# Patient Record
Sex: MALE | Race: WHITE | HISPANIC OR LATINO | Employment: FULL TIME | URBAN - METROPOLITAN AREA
[De-identification: names, ages, dates, MRNs, and addresses within clinical notes are randomized per-mention and may not be internally consistent; named-entity substitution may affect disease eponyms.]

---

## 2021-10-26 ENCOUNTER — HOSPITAL ENCOUNTER (OUTPATIENT)
Dept: VASCULAR ULTRASOUND | Facility: HOSPITAL | Age: 56
Discharge: HOME | End: 2021-10-26
Payer: COMMERCIAL

## 2021-10-26 DIAGNOSIS — M79.2 NERVE PAIN: ICD-10-CM

## 2021-10-26 DIAGNOSIS — M79.662 BILATERAL CALF PAIN: ICD-10-CM

## 2021-10-26 DIAGNOSIS — M79.661 BILATERAL CALF PAIN: ICD-10-CM

## 2021-10-26 PROCEDURE — 93971 EXTREMITY STUDY: CPT

## 2021-10-27 PROCEDURE — 93971 EXTREMITY STUDY: CPT | Performed by: SURGERY

## 2022-11-28 ENCOUNTER — TELEPHONE (OUTPATIENT)
Dept: GASTROENTEROLOGY | Facility: CLINIC | Age: 57
End: 2022-11-28

## 2024-07-09 ENCOUNTER — OFFICE VISIT (OUTPATIENT)
Dept: GASTROENTEROLOGY | Facility: CLINIC | Age: 59
End: 2024-07-09
Payer: COMMERCIAL

## 2024-07-09 VITALS
DIASTOLIC BLOOD PRESSURE: 82 MMHG | RESPIRATION RATE: 18 BRPM | TEMPERATURE: 98.1 F | HEIGHT: 69 IN | BODY MASS INDEX: 28.41 KG/M2 | WEIGHT: 191.8 LBS | SYSTOLIC BLOOD PRESSURE: 122 MMHG | OXYGEN SATURATION: 98 % | HEART RATE: 69 BPM

## 2024-07-09 DIAGNOSIS — K21.9 GASTROESOPHAGEAL REFLUX DISEASE, UNSPECIFIED WHETHER ESOPHAGITIS PRESENT: ICD-10-CM

## 2024-07-09 DIAGNOSIS — I25.10 CORONARY ARTERY DISEASE INVOLVING NATIVE CORONARY ARTERY OF NATIVE HEART WITHOUT ANGINA PECTORIS: ICD-10-CM

## 2024-07-09 DIAGNOSIS — K27.9 PUD (PEPTIC ULCER DISEASE): Primary | ICD-10-CM

## 2024-07-09 DIAGNOSIS — Z12.11 SCREENING FOR COLON CANCER: ICD-10-CM

## 2024-07-09 PROCEDURE — 99204 OFFICE O/P NEW MOD 45 MIN: CPT | Performed by: PHYSICIAN ASSISTANT

## 2024-07-09 RX ORDER — CLOPIDOGREL BISULFATE 75 MG/1
75 TABLET ORAL DAILY
COMMUNITY
Start: 2024-04-19 | End: 2024-11-13

## 2024-07-09 RX ORDER — APIXABAN 5 MG/1
TABLET, FILM COATED ORAL
COMMUNITY
Start: 2024-06-03

## 2024-07-09 RX ORDER — FAMOTIDINE 20 MG/1
20 TABLET, FILM COATED ORAL 2 TIMES DAILY
COMMUNITY
Start: 2024-04-19

## 2024-07-09 RX ORDER — LISINOPRIL 2.5 MG/1
1 TABLET ORAL DAILY
COMMUNITY
Start: 2024-02-27

## 2024-07-09 RX ORDER — ASPIRIN 81 MG/1
81 TABLET, CHEWABLE ORAL DAILY
COMMUNITY

## 2024-07-09 RX ORDER — INSULIN LISPRO 100 [IU]/ML
INJECTION, SOLUTION INTRAVENOUS; SUBCUTANEOUS
COMMUNITY
Start: 2024-02-19

## 2024-07-09 RX ORDER — BACLOFEN 10 MG/1
10 TABLET ORAL 2 TIMES DAILY
COMMUNITY
Start: 2024-04-19

## 2024-07-09 RX ORDER — EZETIMIBE 10 MG/1
TABLET ORAL
COMMUNITY
Start: 2024-05-23

## 2024-07-09 RX ORDER — GABAPENTIN 100 MG/1
100 CAPSULE ORAL 3 TIMES DAILY
COMMUNITY
Start: 2024-04-19

## 2024-07-09 NOTE — PROGRESS NOTES
[facility]  Stanley العراقي 59 y.o. male MRN: [id number]       CC: EGD and colonoscopy consultation    HPI: Marquise is a pleasant 59-year-old male with history of atrial fibrillation status post watchman's procedure now off of Eliquis, CAD on Plavix, previous upper GI bleeding from gastric ulcer in 2022, and type 2 diabetes.  Patient presents the office today by referral of his PCP for EGD and colonoscopy consultation.  Patient reports that a couple of months ago, he was experiencing an increase in his acid reflux symptoms, and his PCP had placed him on famotidine which has been working well.  In 2022, the patient had history of upper GI bleed from a gastric ulcer.  Patient reports that he did not have a follow-up EGD, and I do not see this in the system at [facility].  Patient reports that it has been greater than 10 years since his last colonoscopy.  There is no family history of GI malignancy.  He reports that since his last GI bleed, he does watch the color of his stools.  Denies melena or rectal bleeding.  He has no other concerns from a GI standpoint at this time.      Review of Systems:    CONSTITUTIONAL: Denies any fever, chills, or rigors. Good appetite, and no recent weight loss.  HEENT: No earache or tinnitus. Denies hearing loss or visual disturbances.  CARDIOVASCULAR: No chest pain or palpitations.   RESPIRATORY: Denies any cough, hemoptysis, shortness of breath or dyspnea on exertion.  GASTROINTESTINAL: As noted in the History of Present Illness.   GENITOURINARY: No problems with urination. Denies any hematuria or dysuria.  NEUROLOGIC: No dizziness or vertigo, denies headaches.   MUSCULOSKELETAL: Denies any muscle or joint pain.   SKIN: Denies skin rashes or itching.   ENDOCRINE: Denies excessive thirst. Denies intolerance to heat or cold.  PSYCHOSOCIAL: Denies depression or anxiety. Denies any recent memory loss.       Current Outpatient Medications   Medication Sig Dispense Refill     aspirin 81 mg chewable tablet Chew 81 mg daily      baclofen 10 mg tablet Take 10 mg by mouth 2 (two) times a day      clopidogrel (PLAVIX) 75 mg tablet Take 75 mg by mouth daily      ezetimibe (ZETIA) 10 mg tablet       gabapentin (NEURONTIN) 100 mg capsule Take 100 mg by mouth Three times a day      insulin lispro (HumaLOG KwikPen) 100 units/mL injection pen INJECT 8 UNITS SUBCUTANEOUSLY IN THE MORNING , 8 UNITS AT NOON  AND 8 UNITS IN THE EVENING .  INJECT WITH MEALS      lisinopril (ZESTRIL) 2.5 mg tablet Take 1 tablet by mouth daily      Eliquis 5 MG TAKE 1 TABLET BY MOUTH IN THE  MORNING AND 1 TABLET BY MOUTH  BEFORE BEDTIME (Patient not taking: Reported on 7/9/2024)      famotidine (PEPCID) 20 mg tablet Take 20 mg by mouth 2 (two) times a day (Patient not taking: Reported on 7/9/2024)       No current facility-administered medications for this visit.     Past Medical History:   Diagnosis Date    Diabetes (HCC)     Diabetes mellitus (HCC)      History reviewed. No pertinent surgical history.  Social History     Socioeconomic History    Marital status: /Civil Union     Spouse name: None    Number of children: None    Years of education: None    Highest education level: None   Occupational History    None   Tobacco Use    Smoking status: Never    Smokeless tobacco: Never   Vaping Use    Vaping status: Never Used   Substance and Sexual Activity    Alcohol use: Not Currently    Drug use: Never    Sexual activity: None   Other Topics Concern    None   Social History Narrative    None     Social Determinants of Health     Financial Resource Strain: Not on file   Food Insecurity: Not on file   Transportation Needs: Not on file   Physical Activity: Not on file   Stress: Not on file   Social Connections: Unknown (6/18/2024)    Received from [employer]    Social mydeco     How often do you feel lonely or isolated from those around you? (Adult - for ages 18 years and over): Not on file   Intimate Partner  "Violence: Not on file   Housing Stability: Not on file     Family History   Problem Relation Age of Onset    No Known Problems Mother     No Known Problems Father     No Known Problems Sister     No Known Problems Brother     No Known Problems Maternal Grandmother     No Known Problems Maternal Grandfather     No Known Problems Paternal Grandmother     No Known Problems Paternal Grandfather     No Known Problems Maternal Aunt     No Known Problems Maternal Uncle     No Known Problems Paternal Aunt     No Known Problems Paternal Uncle     No Known Problems Cousin             PHYSICAL EXAM:    Vitals:    07/09/24 1605   BP: 122/82   BP Location: Left arm   Patient Position: Sitting   Cuff Size: Standard   Pulse: 69   Resp: 18   Temp: 98.1 °F (36.7 °C)   TempSrc: Tympanic   SpO2: 98%   Weight: 87 kg (191 lb 12.8 oz)   Height: 5' 9\" (1.753 m)     General Appearance:   Alert and oriented x 3. Cooperative, and in no respiratory distress   HEENT:   Normocephalic, atraumatic, anicteric.     Neck:  Supple, symmetrical, trachea midline   Lungs:   Clear to auscultation bilaterally    Heart::   Regular rate and rhythm   Abdomen:   Soft, non-tender, non-distended; normal bowel sounds; no masses, no organomegaly    Genitalia:   Deferred    Rectal:   Deferred    Extremities:  No cyanosis, clubbing or edema    Pulses:  2+ and symmetric all extremities    Skin:  Skin color, texture, turgor normal, no rashes or lesions    Lymph nodes:  No palpable cervical or supraclavicular lymphadenopathy        Lab Results:       Results from last 6 Months   Lab Units 04/05/24  0752   POTASSIUM mmol/L 5.3*   CHLORIDE mmol/L 100   CO2 mmol/L 26   BUN mg/dL 17   CREATININE mg/dL 1.3*   CALCIUM mg/dL 9.2               Imaging Studies:   No results found.    ASSESSMENT and PLAN:      1) Need for screening colonoscopy, history of CAD on Plavix - We went over risks and benefits of colonoscopy, patient would like to proceed with scheduling.  We will " "asked permission to hold Plavix for 5 days from his cardiologist at [facility].  We will go over MiraLAX prep instructions in the office.    2) Peptic ulcer disease, GERD - Patient was treated for a bleeding antral ulcer in 2022.  Does not appear that he had a follow-up EGD to confirm healing of the peptic ulcer.  Patient reports that he is doing well on Pepcid as prescribed by his PCP at [facility], Dr. Reyes.  He also encouraged the patient to follow-up regarding a follow-up EGD.  Patient reports that he does look at his stool to ensure that he is not having melena, which she did experience during his last GI bleed.  Risks and benefits reviewed the patient, he would like to proceed with scheduling.        Follow up as neede if other GI concerns arise, otherwise for future screening colonoscopy as recommended.d      Portions of the record may have been created with voice recognition software.  Occasional wrong word or \"sound a like\" substitutions may have occurred due to the inherent limitations of voice recognition software.  Read the chart carefully and recognize, using context, where substitutions have occurred.  "

## 2024-07-09 NOTE — H&P (VIEW-ONLY)
[facility]  Stanley العراقي 59 y.o. male MRN: [id number]       CC: EGD and colonoscopy consultation    HPI: Marquise is a pleasant 59-year-old male with history of atrial fibrillation status post watchman's procedure now off of Eliquis, CAD on Plavix, previous upper GI bleeding from gastric ulcer in 2022, and type 2 diabetes.  Patient presents the office today by referral of his PCP for EGD and colonoscopy consultation.  Patient reports that a couple of months ago, he was experiencing an increase in his acid reflux symptoms, and his PCP had placed him on famotidine which has been working well.  In 2022, the patient had history of upper GI bleed from a gastric ulcer.  Patient reports that he did not have a follow-up EGD, and I do not see this in the system at [facility].  Patient reports that it has been greater than 10 years since his last colonoscopy.  There is no family history of GI malignancy.  He reports that since his last GI bleed, he does watch the color of his stools.  Denies melena or rectal bleeding.  He has no other concerns from a GI standpoint at this time.      Review of Systems:    CONSTITUTIONAL: Denies any fever, chills, or rigors. Good appetite, and no recent weight loss.  HEENT: No earache or tinnitus. Denies hearing loss or visual disturbances.  CARDIOVASCULAR: No chest pain or palpitations.   RESPIRATORY: Denies any cough, hemoptysis, shortness of breath or dyspnea on exertion.  GASTROINTESTINAL: As noted in the History of Present Illness.   GENITOURINARY: No problems with urination. Denies any hematuria or dysuria.  NEUROLOGIC: No dizziness or vertigo, denies headaches.   MUSCULOSKELETAL: Denies any muscle or joint pain.   SKIN: Denies skin rashes or itching.   ENDOCRINE: Denies excessive thirst. Denies intolerance to heat or cold.  PSYCHOSOCIAL: Denies depression or anxiety. Denies any recent memory loss.       Current Outpatient Medications   Medication Sig Dispense Refill     aspirin 81 mg chewable tablet Chew 81 mg daily      baclofen 10 mg tablet Take 10 mg by mouth 2 (two) times a day      clopidogrel (PLAVIX) 75 mg tablet Take 75 mg by mouth daily      ezetimibe (ZETIA) 10 mg tablet       gabapentin (NEURONTIN) 100 mg capsule Take 100 mg by mouth Three times a day      insulin lispro (HumaLOG KwikPen) 100 units/mL injection pen INJECT 8 UNITS SUBCUTANEOUSLY IN THE MORNING , 8 UNITS AT NOON  AND 8 UNITS IN THE EVENING .  INJECT WITH MEALS      lisinopril (ZESTRIL) 2.5 mg tablet Take 1 tablet by mouth daily      Eliquis 5 MG TAKE 1 TABLET BY MOUTH IN THE  MORNING AND 1 TABLET BY MOUTH  BEFORE BEDTIME (Patient not taking: Reported on 7/9/2024)      famotidine (PEPCID) 20 mg tablet Take 20 mg by mouth 2 (two) times a day (Patient not taking: Reported on 7/9/2024)       No current facility-administered medications for this visit.     Past Medical History:   Diagnosis Date    Diabetes (HCC)     Diabetes mellitus (HCC)      History reviewed. No pertinent surgical history.  Social History     Socioeconomic History    Marital status: /Civil Union     Spouse name: None    Number of children: None    Years of education: None    Highest education level: None   Occupational History    None   Tobacco Use    Smoking status: Never    Smokeless tobacco: Never   Vaping Use    Vaping status: Never Used   Substance and Sexual Activity    Alcohol use: Not Currently    Drug use: Never    Sexual activity: None   Other Topics Concern    None   Social History Narrative    None     Social Determinants of Health     Financial Resource Strain: Not on file   Food Insecurity: Not on file   Transportation Needs: Not on file   Physical Activity: Not on file   Stress: Not on file   Social Connections: Unknown (6/18/2024)    Received from [employer]    Social [employer]     How often do you feel lonely or isolated from those around you? (Adult - for ages 18 years and over): Not on file   Intimate Partner  "Violence: Not on file   Housing Stability: Not on file     Family History   Problem Relation Age of Onset    No Known Problems Mother     No Known Problems Father     No Known Problems Sister     No Known Problems Brother     No Known Problems Maternal Grandmother     No Known Problems Maternal Grandfather     No Known Problems Paternal Grandmother     No Known Problems Paternal Grandfather     No Known Problems Maternal Aunt     No Known Problems Maternal Uncle     No Known Problems Paternal Aunt     No Known Problems Paternal Uncle     No Known Problems Cousin             PHYSICAL EXAM:    Vitals:    07/09/24 1605   BP: 122/82   BP Location: Left arm   Patient Position: Sitting   Cuff Size: Standard   Pulse: 69   Resp: 18   Temp: 98.1 °F (36.7 °C)   TempSrc: Tympanic   SpO2: 98%   Weight: 87 kg (191 lb 12.8 oz)   Height: 5' 9\" (1.753 m)     General Appearance:   Alert and oriented x 3. Cooperative, and in no respiratory distress   HEENT:   Normocephalic, atraumatic, anicteric.     Neck:  Supple, symmetrical, trachea midline   Lungs:   Clear to auscultation bilaterally    Heart::   Regular rate and rhythm   Abdomen:   Soft, non-tender, non-distended; normal bowel sounds; no masses, no organomegaly    Genitalia:   Deferred    Rectal:   Deferred    Extremities:  No cyanosis, clubbing or edema    Pulses:  2+ and symmetric all extremities    Skin:  Skin color, texture, turgor normal, no rashes or lesions    Lymph nodes:  No palpable cervical or supraclavicular lymphadenopathy        Lab Results:       Results from last 6 Months   Lab Units 04/05/24  0752   POTASSIUM mmol/L 5.3*   CHLORIDE mmol/L 100   CO2 mmol/L 26   BUN mg/dL 17   CREATININE mg/dL 1.3*   CALCIUM mg/dL 9.2               Imaging Studies:   No results found.    ASSESSMENT and PLAN:      1) Need for screening colonoscopy, history of CAD on Plavix - We went over risks and benefits of colonoscopy, patient would like to proceed with scheduling.  We will " "asked permission to hold Plavix for 5 days from his cardiologist at [facility].  We will go over MiraLAX prep instructions in the office.    2) Peptic ulcer disease, GERD - Patient was treated for a bleeding antral ulcer in 2022.  Does not appear that he had a follow-up EGD to confirm healing of the peptic ulcer.  Patient reports that he is doing well on Pepcid as prescribed by his PCP at [facility], Dr. Reyes.  He also encouraged the patient to follow-up regarding a follow-up EGD.  Patient reports that he does look at his stool to ensure that he is not having melena, which she did experience during his last GI bleed.  Risks and benefits reviewed the patient, he would like to proceed with scheduling.        Follow up as neede if other GI concerns arise, otherwise for future screening colonoscopy as recommended.d      Portions of the record may have been created with voice recognition software.  Occasional wrong word or \"sound a like\" substitutions may have occurred due to the inherent limitations of voice recognition software.  Read the chart carefully and recognize, using context, where substitutions have occurred.  "

## 2024-07-09 NOTE — PATIENT INSTRUCTIONS
Scheduled date of EGD/colonoscopy (as of today):7/19/24  Physician performing EGD/colonoscopy:John  Location of EGD/colonoscopy:[place]  Desired bowel prep reviewed with patient:Sandeep/Miralax  Instructions reviewed with patient by:Gm rios  Clearances:   none

## 2024-07-10 ENCOUNTER — TELEPHONE (OUTPATIENT)
Dept: GASTROENTEROLOGY | Facility: CLINIC | Age: 59
End: 2024-07-10

## 2024-07-11 ENCOUNTER — TELEPHONE (OUTPATIENT)
Dept: GASTROENTEROLOGY | Facility: CLINIC | Age: 59
End: 2024-07-11

## 2024-07-11 NOTE — TELEPHONE ENCOUNTER
----- Message from Oksana Purvis PA-C sent at 7/9/2024  4:20 PM EDT -----  Humphrey Sousa MD   [address] [place] [id number] [phone] (Work)   [phone] (Fax)       Please fax over clearance sheet for colonoscopy, patient needs to hold Plavix for 5 days.  Thank you

## 2024-07-11 NOTE — TELEPHONE ENCOUNTER
Awaiting response from Dr elena after faxing the medication clearance form to office on 7/10/2024.///

## 2024-07-15 ENCOUNTER — TELEPHONE (OUTPATIENT)
Dept: GASTROENTEROLOGY | Facility: CLINIC | Age: 59
End: 2024-07-15

## 2024-07-15 NOTE — TELEPHONE ENCOUNTER
Called Dr Sousa office a 2nd time and spoke to ara. Let her know no fax was received. She stated she had to check with the MA who took care of fax when it was originally faxed on 7/10/2024. She will call back .... Gave her office #

## 2024-07-15 NOTE — TELEPHONE ENCOUNTER
----- Message from Oksana Purvis PA-C sent at 7/9/2024  4:20 PM EDT -----  Humphrey Sousa MD   [address] [place] [id number] [phone] (Work)   [phone] (Fax)       Please fax over clearance sheet for colonoscopy, patient needs to hold Plavix for 5 days.  Thank you

## 2024-07-15 NOTE — TELEPHONE ENCOUNTER
----- Message from Oksana Purvis PA-C sent at 7/9/2024  4:20 PM EDT -----  Humphrey Sousa MD   [address] [place] [id number] [phone] (Work)   [phone] (Fax)       Please fax over clearance sheet for colonoscopy, patient needs to hold Plavix for 5 days.  Thank you

## 2024-07-15 NOTE — TELEPHONE ENCOUNTER
Called Dr Sousa office & spoke to ara. Let her know that a medication clearance form was sent to office. She stated that there was a note put in patients chart in regards to Plavix hold.  She stated she will fax form to ut office .....

## 2024-07-15 NOTE — TELEPHONE ENCOUNTER
Yun from Dr Leone office returned call to office. They did receive the Fax to hold Plavix .. MA put on  desblanca.  only in Tuesday and Thursdays.  did not fill out. Gave information to Lakeshia so she can R/S patients procedure which is scheduled on 7/19/2024. Yun stated they will re do a medication hold and then fax to office to Lakeshia attention..

## 2024-07-16 ENCOUNTER — NURSE TRIAGE (OUTPATIENT)
Age: 59
End: 2024-07-16

## 2024-07-16 NOTE — TELEPHONE ENCOUNTER
Im ok doing the procedure on plavix and taking biopsies and removing smaller polyps but no large ones but will defer to you Cassy

## 2024-07-17 NOTE — TELEPHONE ENCOUNTER
Deya, please make sure that the patient knows that his cardiologist does not advise that he stop Plavix.  Dr. King agreed to do the procedure, but we will only be able to remove small polyps, and if there happens to be larger polyps we will have to bring him in another day while he is on aspirin potentially.  Thank you so much

## 2024-07-17 NOTE — TELEPHONE ENCOUNTER
Please instruct patient that he must restart his Plavix since his cardiologist did not give us permission to hold it.

## 2024-07-17 NOTE — TELEPHONE ENCOUNTER
Spoke with patient, instructed him to restart the Plavix due to recommendation of cardiologist to prevent clotting.  He voiced understanding and will take the medication today.  We do not need to cancel his procedures Dr. Dubois he agreed to continue forward.  Thank you

## 2024-07-17 NOTE — TELEPHONE ENCOUNTER
Spoke to the ptn, he's already been off of it for 3 days. I did tell him what you both said but he said he will not be taking it for 2 more days... if you have any concerns I told him one of you would reach out to him.

## 2024-07-18 RX ORDER — SODIUM CHLORIDE, SODIUM LACTATE, POTASSIUM CHLORIDE, CALCIUM CHLORIDE 600; 310; 30; 20 MG/100ML; MG/100ML; MG/100ML; MG/100ML
125 INJECTION, SOLUTION INTRAVENOUS CONTINUOUS
Status: CANCELLED | OUTPATIENT
Start: 2024-07-18

## 2024-07-18 RX ORDER — LIDOCAINE HYDROCHLORIDE 10 MG/ML
0.5 INJECTION, SOLUTION EPIDURAL; INFILTRATION; INTRACAUDAL; PERINEURAL ONCE AS NEEDED
Status: CANCELLED | OUTPATIENT
Start: 2024-07-18

## 2024-07-19 ENCOUNTER — ANESTHESIA EVENT (OUTPATIENT)
Dept: GASTROENTEROLOGY | Facility: HOSPITAL | Age: 59
End: 2024-07-19

## 2024-07-19 ENCOUNTER — HOSPITAL ENCOUNTER (OUTPATIENT)
Dept: GASTROENTEROLOGY | Facility: HOSPITAL | Age: 59
Setting detail: OUTPATIENT SURGERY
End: 2024-07-19
Payer: COMMERCIAL

## 2024-07-19 ENCOUNTER — ANESTHESIA (OUTPATIENT)
Dept: GASTROENTEROLOGY | Facility: HOSPITAL | Age: 59
End: 2024-07-19

## 2024-07-19 VITALS
HEIGHT: 69 IN | HEART RATE: 59 BPM | RESPIRATION RATE: 16 BRPM | OXYGEN SATURATION: 100 % | TEMPERATURE: 98.3 F | BODY MASS INDEX: 27.59 KG/M2 | WEIGHT: 186.29 LBS | SYSTOLIC BLOOD PRESSURE: 123 MMHG | DIASTOLIC BLOOD PRESSURE: 66 MMHG

## 2024-07-19 DIAGNOSIS — K27.9 PUD (PEPTIC ULCER DISEASE): ICD-10-CM

## 2024-07-19 DIAGNOSIS — Z12.11 SCREENING FOR COLON CANCER: ICD-10-CM

## 2024-07-19 DIAGNOSIS — K21.9 GASTROESOPHAGEAL REFLUX DISEASE, UNSPECIFIED WHETHER ESOPHAGITIS PRESENT: ICD-10-CM

## 2024-07-19 LAB — GLUCOSE SERPL-MCNC: 151 MG/DL (ref 65–140)

## 2024-07-19 PROCEDURE — 82948 REAGENT STRIP/BLOOD GLUCOSE: CPT

## 2024-07-19 RX ORDER — LIDOCAINE HYDROCHLORIDE 20 MG/ML
INJECTION, SOLUTION EPIDURAL; INFILTRATION; INTRACAUDAL; PERINEURAL AS NEEDED
Status: DISCONTINUED | OUTPATIENT
Start: 2024-07-19 | End: 2024-07-19

## 2024-07-19 RX ORDER — ALBUTEROL SULFATE 2.5 MG/3ML
2.5 SOLUTION RESPIRATORY (INHALATION) ONCE AS NEEDED
Status: CANCELLED | OUTPATIENT
Start: 2024-07-19

## 2024-07-19 RX ORDER — SODIUM CHLORIDE, SODIUM LACTATE, POTASSIUM CHLORIDE, CALCIUM CHLORIDE 600; 310; 30; 20 MG/100ML; MG/100ML; MG/100ML; MG/100ML
INJECTION, SOLUTION INTRAVENOUS CONTINUOUS PRN
Status: DISCONTINUED | OUTPATIENT
Start: 2024-07-19 | End: 2024-07-19

## 2024-07-19 RX ORDER — PROPOFOL 10 MG/ML
INJECTION, EMULSION INTRAVENOUS AS NEEDED
Status: DISCONTINUED | OUTPATIENT
Start: 2024-07-19 | End: 2024-07-19

## 2024-07-19 RX ORDER — SODIUM CHLORIDE, SODIUM LACTATE, POTASSIUM CHLORIDE, CALCIUM CHLORIDE 600; 310; 30; 20 MG/100ML; MG/100ML; MG/100ML; MG/100ML
125 INJECTION, SOLUTION INTRAVENOUS CONTINUOUS
Status: CANCELLED | OUTPATIENT
Start: 2024-07-19

## 2024-07-19 RX ORDER — ONDANSETRON 2 MG/ML
4 INJECTION INTRAMUSCULAR; INTRAVENOUS ONCE AS NEEDED
Status: CANCELLED | OUTPATIENT
Start: 2024-07-19

## 2024-07-19 RX ADMIN — PROPOFOL 150 MG: 10 INJECTION, EMULSION INTRAVENOUS at 07:44

## 2024-07-19 RX ADMIN — PROPOFOL 30 MG: 10 INJECTION, EMULSION INTRAVENOUS at 07:55

## 2024-07-19 RX ADMIN — PROPOFOL 50 MG: 10 INJECTION, EMULSION INTRAVENOUS at 07:51

## 2024-07-19 RX ADMIN — LIDOCAINE HYDROCHLORIDE 50 MG: 20 INJECTION, SOLUTION EPIDURAL; INFILTRATION; INTRACAUDAL; PERINEURAL at 07:43

## 2024-07-19 RX ADMIN — PROPOFOL 30 MG: 10 INJECTION, EMULSION INTRAVENOUS at 07:59

## 2024-07-19 RX ADMIN — SODIUM CHLORIDE, SODIUM LACTATE, POTASSIUM CHLORIDE, AND CALCIUM CHLORIDE: .6; .31; .03; .02 INJECTION, SOLUTION INTRAVENOUS at 07:37

## 2024-07-19 NOTE — ANESTHESIA POSTPROCEDURE EVALUATION
Post-Op Assessment Note    CV Status:  Stable  Pain Score: 0    Pain management: adequate       Mental Status:  Awake and sleepy   Hydration Status:  Stable   PONV Controlled:  None   Airway Patency:  Patent     Post Op Vitals Reviewed: Yes    No anethesia notable event occurred.    Staff: Anesthesiologist               BP   101/61   Temp 97   Pulse 54   Resp 16   SpO2 98

## 2024-07-19 NOTE — ANESTHESIA PREPROCEDURE EVALUATION
Procedure:  COLONOSCOPY  EGD    Neg stress echo 2022    Hx of Afib s/p watchman    CAD s/p CABG 2013, stent 2016  - on plavix (held today)    Relevant Problems   CARDIO   (+) Hypertension      Endocrine   (+) Diabetes mellitus (HCC)        Physical Exam    Airway    Mallampati score: III  TM Distance: >3 FB  Neck ROM: full     Dental   No notable dental hx     Cardiovascular  Rhythm: regular, Rate: normal, Cardiovascular exam normal    Pulmonary  Pulmonary exam normal Breath sounds clear to auscultation    Other Findings        Anesthesia Plan  ASA Score- 3     Anesthesia Type- IV sedation with anesthesia with ASA Monitors.         Additional Monitors:     Airway Plan:     Comment: Discussed risks/benefits, including medication reactions, awareness, aspiration, and serious/life threatening complications.    Plan to maintain native airway with IVGA, monitored with EtCO2.       Plan Factors-Exercise tolerance (METS): >4 METS.    Chart reviewed.    Patient summary reviewed.      Patient instructed to abstain from smoking on day of procedure. Patient did not smoke on day of surgery.            Induction- intravenous.    Postoperative Plan-         Informed Consent- Anesthetic plan and risks discussed with patient.  I personally reviewed this patient with the CRNA. Discussed and agreed on the Anesthesia Plan with the CRNA..

## 2024-07-26 LAB — MISCELLANEOUS LAB TEST RESULT: NORMAL

## 2024-07-29 ENCOUNTER — TELEPHONE (OUTPATIENT)
Age: 59
End: 2024-07-29

## 2024-07-29 DIAGNOSIS — A04.8 H. PYLORI INFECTION: Primary | ICD-10-CM

## 2024-07-29 RX ORDER — TETRACYCLINE HYDROCHLORIDE 500 MG/1
500 CAPSULE ORAL 4 TIMES DAILY
Qty: 56 CAPSULE | Refills: 0 | Status: SHIPPED | OUTPATIENT
Start: 2024-07-29 | End: 2024-08-12

## 2024-07-29 RX ORDER — BISMUTH SUBSALICYLATE 262 MG/1
524 TABLET, CHEWABLE ORAL
Qty: 112 TABLET | Refills: 0 | Status: SHIPPED | OUTPATIENT
Start: 2024-07-29 | End: 2024-08-12

## 2024-07-29 RX ORDER — OMEPRAZOLE 40 MG/1
40 CAPSULE, DELAYED RELEASE ORAL 2 TIMES DAILY
Qty: 28 CAPSULE | Refills: 0 | Status: SHIPPED | OUTPATIENT
Start: 2024-07-29 | End: 2024-08-12

## 2024-07-29 RX ORDER — METRONIDAZOLE 500 MG/1
500 TABLET ORAL 4 TIMES DAILY
Qty: 56 TABLET | Refills: 0 | Status: SHIPPED | OUTPATIENT
Start: 2024-07-29 | End: 2024-08-12

## 2024-07-29 NOTE — TELEPHONE ENCOUNTER
----- Message from Kisrtin Lopez MD sent at 7/28/2024  3:15 PM EDT -----  Hi can we call patient about HP and treat accordingly. Colon in 10 years for screening. Thank you.

## 2024-07-30 ENCOUNTER — TELEPHONE (OUTPATIENT)
Age: 59
End: 2024-07-30

## 2024-07-30 NOTE — TELEPHONE ENCOUNTER
PA for Omeprazole BID (H pylori) SUBMITTED     via    []CMM-KEY   [x]Piazza-Case ID # [id number]:[facility] - InformedRxPhone:[phone]   []Faxed to plan   []Other website   []Phone call Case ID #     Office notes sent, clinical questions answered. Awaiting determination    Turnaround time for your insurance to make a decision on your Prior Authorization can take 7-21 business days.

## 2024-07-31 NOTE — TELEPHONE ENCOUNTER
PA for Omeprazole Approved     Date(s) approved July 30, 2024 to August 13, 2024     Case #[id number]     Patient advised by          [] QXL ricardo plchart Message  [x] Phone call pt voiced understanding  []LMOM  []L/M to call office as no active Communication consent on file  []Unable to leave detailed message as VM not approved on Communication consent       Pharmacy advised by    [x]Fax  []Phone call    Approval letter scanned into Media Yes

## 2024-10-13 ENCOUNTER — HOSPITAL ENCOUNTER (EMERGENCY)
Facility: HOSPITAL | Age: 59
Discharge: HOME | End: 2024-10-13
Attending: EMERGENCY MEDICINE | Admitting: EMERGENCY MEDICINE
Payer: COMMERCIAL

## 2024-10-13 ENCOUNTER — APPOINTMENT (EMERGENCY)
Dept: RADIOLOGY | Facility: HOSPITAL | Age: 59
End: 2024-10-13
Payer: COMMERCIAL

## 2024-10-13 VITALS
RESPIRATION RATE: 20 BRPM | HEIGHT: 69 IN | HEART RATE: 94 BPM | SYSTOLIC BLOOD PRESSURE: 152 MMHG | DIASTOLIC BLOOD PRESSURE: 87 MMHG | OXYGEN SATURATION: 98 % | WEIGHT: 186.95 LBS | BODY MASS INDEX: 27.69 KG/M2

## 2024-10-13 DIAGNOSIS — M70.61 TROCHANTERIC BURSITIS OF RIGHT HIP: Primary | ICD-10-CM

## 2024-10-13 LAB
ALBUMIN SERPL BCG-MCNC: 3.9 G/DL (ref 3.5–5)
ALP SERPL-CCNC: 39 U/L (ref 34–104)
ALT SERPL W P-5'-P-CCNC: 24 U/L (ref 7–52)
ANION GAP SERPL CALCULATED.3IONS-SCNC: 5 MMOL/L (ref 4–13)
AST SERPL W P-5'-P-CCNC: 17 U/L (ref 13–39)
BASOPHILS # BLD AUTO: 0.04 THOUSANDS/ΜL (ref 0–0.1)
BASOPHILS NFR BLD AUTO: 1 % (ref 0–1)
BILIRUB SERPL-MCNC: 0.94 MG/DL (ref 0.2–1)
BUN SERPL-MCNC: 18 MG/DL (ref 5–25)
CALCIUM SERPL-MCNC: 8.5 MG/DL (ref 8.4–10.2)
CHLORIDE SERPL-SCNC: 102 MMOL/L (ref 96–108)
CO2 SERPL-SCNC: 26 MMOL/L (ref 21–32)
CREAT SERPL-MCNC: 1.34 MG/DL (ref 0.6–1.3)
EOSINOPHIL # BLD AUTO: 0.07 THOUSAND/ΜL (ref 0–0.61)
EOSINOPHIL NFR BLD AUTO: 1 % (ref 0–6)
ERYTHROCYTE [DISTWIDTH] IN BLOOD BY AUTOMATED COUNT: 13.7 % (ref 11.6–15.1)
GFR SERPL CREATININE-BSD FRML MDRD: 57 ML/MIN/1.73SQ M
GLUCOSE SERPL-MCNC: 178 MG/DL (ref 65–140)
HCT VFR BLD AUTO: 38.5 % (ref 36.5–49.3)
HGB BLD-MCNC: 13.4 G/DL (ref 12–17)
IMM GRANULOCYTES # BLD AUTO: 0.03 THOUSAND/UL (ref 0–0.2)
IMM GRANULOCYTES NFR BLD AUTO: 0 % (ref 0–2)
LYMPHOCYTES # BLD AUTO: 1.17 THOUSANDS/ΜL (ref 0.6–4.47)
LYMPHOCYTES NFR BLD AUTO: 16 % (ref 14–44)
MCH RBC QN AUTO: 29.5 PG (ref 26.8–34.3)
MCHC RBC AUTO-ENTMCNC: 34.8 G/DL (ref 31.4–37.4)
MCV RBC AUTO: 85 FL (ref 82–98)
MONOCYTES # BLD AUTO: 0.63 THOUSAND/ΜL (ref 0.17–1.22)
MONOCYTES NFR BLD AUTO: 9 % (ref 4–12)
NEUTROPHILS # BLD AUTO: 5.36 THOUSANDS/ΜL (ref 1.85–7.62)
NEUTS SEG NFR BLD AUTO: 73 % (ref 43–75)
NRBC BLD AUTO-RTO: 0 /100 WBCS
PLATELET # BLD AUTO: 144 THOUSANDS/UL (ref 149–390)
PMV BLD AUTO: 10.1 FL (ref 8.9–12.7)
POTASSIUM SERPL-SCNC: 4.2 MMOL/L (ref 3.5–5.3)
PROT SERPL-MCNC: 6.8 G/DL (ref 6.4–8.4)
RBC # BLD AUTO: 4.54 MILLION/UL (ref 3.88–5.62)
SODIUM SERPL-SCNC: 133 MMOL/L (ref 135–147)
WBC # BLD AUTO: 7.3 THOUSAND/UL (ref 4.31–10.16)

## 2024-10-13 PROCEDURE — 80053 COMPREHEN METABOLIC PANEL: CPT | Performed by: EMERGENCY MEDICINE

## 2024-10-13 PROCEDURE — 20610 DRAIN/INJ JOINT/BURSA W/O US: CPT | Performed by: EMERGENCY MEDICINE

## 2024-10-13 PROCEDURE — 85025 COMPLETE CBC W/AUTO DIFF WBC: CPT | Performed by: EMERGENCY MEDICINE

## 2024-10-13 PROCEDURE — 99284 EMERGENCY DEPT VISIT MOD MDM: CPT

## 2024-10-13 PROCEDURE — 36415 COLL VENOUS BLD VENIPUNCTURE: CPT | Performed by: EMERGENCY MEDICINE

## 2024-10-13 PROCEDURE — 73130 X-RAY EXAM OF HAND: CPT

## 2024-10-13 PROCEDURE — 99284 EMERGENCY DEPT VISIT MOD MDM: CPT | Performed by: EMERGENCY MEDICINE

## 2024-10-13 RX ORDER — HYDROMORPHONE HCL/PF 1 MG/ML
0.5 SYRINGE (ML) INJECTION ONCE
Status: COMPLETED | OUTPATIENT
Start: 2024-10-13 | End: 2024-10-13

## 2024-10-13 RX ORDER — TRIAMCINOLONE ACETONIDE 40 MG/ML
40 INJECTION, SUSPENSION INTRA-ARTICULAR; INTRAMUSCULAR ONCE
Status: COMPLETED | OUTPATIENT
Start: 2024-10-13 | End: 2024-10-13

## 2024-10-13 RX ORDER — ROPIVACAINE HYDROCHLORIDE 5 MG/ML
10 INJECTION, SOLUTION EPIDURAL; INFILTRATION; PERINEURAL ONCE
Status: COMPLETED | OUTPATIENT
Start: 2024-10-13 | End: 2024-10-13

## 2024-10-13 RX ADMIN — HYDROMORPHONE HYDROCHLORIDE 0.5 MG: 1 INJECTION, SOLUTION INTRAMUSCULAR; INTRAVENOUS; SUBCUTANEOUS at 12:56

## 2024-10-13 RX ADMIN — TRIAMCINOLONE ACETONIDE 40 MG: 40 INJECTION, SUSPENSION INTRA-ARTICULAR; INTRAMUSCULAR at 14:27

## 2024-10-13 RX ADMIN — ROPIVACAINE HYDROCHLORIDE 10 ML: 5 INJECTION, SOLUTION EPIDURAL; INFILTRATION; PERINEURAL at 14:27

## 2024-10-13 RX ADMIN — DICLOFENAC SODIUM 2 G: 10 GEL TOPICAL at 12:57

## 2024-10-13 NOTE — Clinical Note
Stanley العراقي was seen and treated in our emergency department on 10/13/2024.                Diagnosis:     Stanley  may return to work on return date, is off the rest of the shift today.    He may return on this date: 10/15/2024         If you have any questions or concerns, please don't hesitate to call.      Katherine Man, DO    ______________________________           _______________          _______________  Hospital Representative                              Date                                Time

## 2024-10-13 NOTE — ED PROVIDER NOTES
Time reflects when diagnosis was documented in both MDM as applicable and the Disposition within this note       Time User Action Codes Description Comment    10/13/2024  2:31 PM Katherine Man [M70.61] Trochanteric bursitis of right hip           ED Disposition       ED Disposition   Discharge    Condition   Stable    Date/Time   Sun Oct 13, 2024  2:31 PM    Comment   Stanley العراقي discharge to home/self care.                   Assessment & Plan       Medical Decision Making  59-year-old male presents for evaluation with right hip pain.  On exam, patient with normal vitals, no acute distress.  Patient noted to have swelling and tenderness over the greater trochanter of the right femur without associated overlying erythema or warmth.  Findings most consistent with a trochanteric bursitis.  CBC without leukocytosis, and no other findings on exam to suggest infectious process at this time.  Patient given IV Dilaudid and Voltaren gel for symptomatic treatment.  Patient was also offered an injection of the bursa and he was agreeable at this time.  Bursa was injected with Kenalog and ropivacaine.  After injection, patient reported significant improvement in symptoms.  Patient also endorsed difficulty in extending his right fourth finger.  X-ray of the hand was obtained, no acute osseous abnormalities noted.  Likely arthritis versus other chronic condition.  Patient instructed to follow-up with his PCP.  Patient's kidney function also rechecked at this time, creatinine back to his baseline at 1.34.     At this time, patient stable for discharge.  He was instructed to follow-up with his PCP.  Patient given symptomatic care instructions and strict ED return precautions.    Amount and/or Complexity of Data Reviewed  Labs: ordered. Decision-making details documented in ED Course.  Radiology: ordered and independent interpretation performed.    Risk  Prescription drug management.        ED Course as of 10/13/24 1540   [place] Oct  13, 2024   1323 Creatinine(!): 1.34  Back to baseline       Medications   Diclofenac Sodium (VOLTAREN) 1 % topical gel 2 g (2 g Topical Given 10/13/24 1257)   HYDROmorphone (DILAUDID) injection 0.5 mg (0.5 mg Intravenous Given 10/13/24 1256)   ropivacaine (NAROPIN) 0.5 % injection 10 mL (10 mL Infiltration Given 10/13/24 1427)   triamcinolone acetonide (KENALOG-40) 40 mg/mL injection 40 mg (40 mg Infiltration Given 10/13/24 1427)       ED Risk Strat Scores                                               History of Present Illness       Chief Complaint   Patient presents with    Hip Pain     Pt c/o of right sided hip that travels down to the middle of the thigh. This began 3 days. Pt was seen at [facility] on Friday and they took xrays and CT but did not find anything, per pt.        Past Medical History:   Diagnosis Date    Diabetes (HCC)     Diabetes mellitus (HCC)     Hyperlipidemia     Hypertension       Past Surgical History:   Procedure Laterality Date    CARDIAC SURGERY  2013    stents were put in.    COLONOSCOPY        Family History   Problem Relation Age of Onset    No Known Problems Mother     No Known Problems Father     No Known Problems Sister     No Known Problems Brother     No Known Problems Maternal Grandmother     No Known Problems Maternal Grandfather     No Known Problems Paternal Grandmother     No Known Problems Paternal Grandfather     No Known Problems Maternal Aunt     No Known Problems Maternal Uncle     No Known Problems Paternal Aunt     No Known Problems Paternal Uncle     No Known Problems Cousin       Social History     Tobacco Use    Smoking status: Never    Smokeless tobacco: Never   Vaping Use    Vaping status: Never Used   Substance Use Topics    Alcohol use: Not Currently    Drug use: Never      E-Cigarette/Vaping    E-Cigarette Use Never User       E-Cigarette/Vaping Substances    Nicotine No     THC No     CBD No     Flavoring No     Other No     Unknown No       I have reviewed and  agree with the history as documented.     59-year-old male with a past medical history of hypertension, hyperlipidemia, and diabetes presents for evaluation with right hip pain.  Patient states that the pain has been worsening over the past 3 days.  He states that the pain is located over the lateral aspect of the right hip, and radiates down the side of his leg.  Pain is worse with movement and ambulation.  Patient noticed some swelling over the lateral right hip earlier today.  Patient was seen at [facility] 2 days ago and had an xray of the hip at that time which showed mild degenerative changes in the right hip.  Patient states that he was given Toradol at that time, which did help with the pain for short period of time.  He states that he has also been taking Tylenol, his prescribed muscle relaxers, and using heating pads without significant relief in pain.  Patient denies any injury to the area.  His significant other states that he did feel warm last night, but she did not take temperature.  No known fevers otherwise.  Patient's significant other is also concerned because when the patient was at [facility] 2 days ago, it was noted that he had an acute kidney injury.  She states that they gave him fluids and told him to follow-up for reevaluation of his kidney function.  The patient is also noticed that recently he has been getting intermittent numbness in his entire right hand.  He states that this usually occurs when he leans on his arm.  He has also noticed that he is unable to fully extend the fourth digit of his right hand and he is having some discomfort in this finger as well.  He denies any injuries to his hand or upper extremity.  Denies any associated neck pain.        Review of Systems   Constitutional:  Negative for fever.   Respiratory:  Negative for shortness of breath.    Cardiovascular:  Negative for chest pain.   Gastrointestinal:  Negative for abdominal pain, nausea and vomiting.    Musculoskeletal:  Positive for arthralgias and joint swelling.   All other systems reviewed and are negative.          Objective       ED Triage Vitals   Temp Pulse Blood Pressure Respirations SpO2 Patient Position - Orthostatic VS   -- 10/13/24 1154 10/13/24 1154 10/13/24 1154 10/13/24 1154 10/13/24 1154    94 152/87 20 98 % Sitting      Temp Source Heart Rate Source BP Location FiO2 (%) Pain Score    10/13/24 1154 -- 10/13/24 1154 -- 10/13/24 1256    Temporal  Left arm  7      Vitals      Date and Time Temp Pulse SpO2 Resp BP Pain Score FACES Pain Rating User   10/13/24 1256 -- -- -- -- -- 7 -- OWEN   10/13/24 1154 -- 94 98 % 20 152/87 -- -- CO            Physical Exam  Vitals and nursing note reviewed.   Constitutional:       General: He is awake. He is not in acute distress.     Appearance: He is not toxic-appearing.   HENT:      Head: Normocephalic and atraumatic.   Eyes:      General: Vision grossly intact. Gaze aligned appropriately.   Cardiovascular:      Rate and Rhythm: Normal rate and regular rhythm.      Heart sounds: Normal heart sounds.   Pulmonary:      Effort: Pulmonary effort is normal. No respiratory distress.      Breath sounds: Normal breath sounds.   Musculoskeletal:      Cervical back: Full passive range of motion without pain and neck supple.      Comments: Swelling noted over the greater trochanter of the right femur.  Area is tender to palpation.  No overlying erythema, area not warm to the touch.  Rest of hip and right upper leg without tenderness or swelling.  Fourth digit of the right hand noted to have a slight contracture, patient is unable to fully extend the digit.  No obvious swelling or tenderness over the digit.  No overlying color changes, normal capillary refill and sensation.   Skin:     General: Skin is warm and dry.   Neurological:      General: No focal deficit present.      Mental Status: He is alert and oriented to person, place, and time.         Results Reviewed        Procedure Component Value Units Date/Time    Comprehensive metabolic panel [[id number]]  (Abnormal) Collected: 10/13/24 1257    Lab Status: Final result Specimen: Blood from Arm, Right Updated: 10/13/24 1322     Sodium 133 mmol/L      Potassium 4.2 mmol/L      Chloride 102 mmol/L      CO2 26 mmol/L      ANION GAP 5 mmol/L      BUN 18 mg/dL      Creatinine 1.34 mg/dL      Glucose 178 mg/dL      Calcium 8.5 mg/dL      AST 17 U/L      ALT 24 U/L      Alkaline Phosphatase 39 U/L      Total Protein 6.8 g/dL      Albumin 3.9 g/dL      Total Bilirubin 0.94 mg/dL      eGFR 57 ml/min/1.73sq m     Narrative:      National Kid[employer] Disease Foundation guidelines for Chronic Kidney Disease (CKD):     Stage 1 with normal or high GFR (GFR > 90 mL/min/1.73 square meters)    Stage 2 Mild CKD (GFR = 60-89 mL/min/1.73 square meters)    Stage 3A Moderate CKD (GFR = 45-59 mL/min/1.73 square meters)    Stage 3B Moderate CKD (GFR = 30-44 mL/min/1.73 square meters)    Stage 4 Severe CKD (GFR = 15-29 mL/min/1.73 square meters)    Stage 5 End Stage CKD (GFR <15 mL/min/1.73 square meters)  Note: GFR calculation is accurate only with a steady state creatinine    CBC and differential [[id number]]  (Abnormal) Collected: 10/13/24 1257    Lab Status: Final result Specimen: Blood from Arm, Right Updated: 10/13/24 1308     WBC 7.30 Thousand/uL      RBC 4.54 Million/uL      Hemoglobin 13.4 g/dL      Hematocrit 38.5 %      MCV 85 fL      MCH 29.5 pg      MCHC 34.8 g/dL      RDW 13.7 %      MPV 10.1 fL      Platelets 144 Thousands/uL      nRBC 0 /100 WBCs      Segmented % 73 %      Immature Grans % 0 %      Lymphocytes % 16 %      Monocytes % 9 %      Eosinophils Relative 1 %      Basophils Relative 1 %      Absolute Neutrophils 5.36 Thousands/µL      Absolute Immature Grans 0.03 Thousand/uL      Absolute Lymphocytes 1.17 Thousands/µL      Absolute Monocytes 0.63 Thousand/µL      Eosinophils Absolute 0.07 Thousand/µL      Basophils Absolute 0.04  Thousands/µL     UA (URINE) with reflex to Scope [[id number]]     Lab Status: No result Specimen: Urine             XR hand 3+ views RIGHT   ED Interpretation by Katherine Man DO (10/13 1311)   No acute osseous abnormalities          General Procedure    Date/Time: 10/13/2024 2:26 PM    Performed by: Katherine Man DO  Authorized by: Katherine Man DO    Patient location:  ED  Assisting Provider(s): No    Consent:     Consent obtained:  Verbal    Consent given by:  Patient    Risks discussed:  Bleeding, infection and pain    Alternatives discussed:  Alternative treatment  Universal protocol:     Patient identity confirmed:  Arm band  Indications:     Indications:  Bursitis  Pre-procedure details:     Skin preparation:  ChloraPrep  Anesthesia (see MAR for exact dosages):     Anesthesia method:  Local infiltration (injected bursa)    Local anesthetic: 1 mL of kenalog (40 mg/mL) and 4 mL of ropivacaine 0.5%  Procedure Detail:     Equipment used:  27G needle    Procedure note (site, laterality, method, findings):  Right trochanteric bursa injection.  Area was cleaned using ChloraPrep.  Kenalog and ropivacaine were injected into the bursa using a 27-gauge needle after a negative aspiration.  Patient had improvement in pain after injection.  Post-procedure details:     Patient tolerance of procedure:  Tolerated well, no immediate complications      ED Medication and Procedure Management   Prior to Admission Medications   Prescriptions Last Dose Informant Patient Reported? Taking?   Eliquis 5 MG   Yes No   Sig: TAKE 1 TABLET BY MOUTH IN THE  MORNING AND 1 TABLET BY MOUTH  BEFORE BEDTIME   Patient not taking: Reported on 7/9/2024   aspirin 81 mg chewable tablet   Yes No   Sig: Chew 81 mg daily   baclofen 10 mg tablet   Yes No   Sig: Take 10 mg by mouth 2 (two) times a day   clopidogrel (PLAVIX) 75 mg tablet   Yes No   Sig: Take 75 mg by mouth daily   ezetimibe (ZETIA) 10 mg tablet   Yes No   famotidine (PEPCID) 20 mg  tablet   Yes No   Sig: Take 20 mg by mouth 2 (two) times a day   Patient not taking: Reported on 7/9/2024   gabapentin (NEURONTIN) 100 mg capsule   Yes No   Sig: Take 100 mg by mouth Three times a day   insulin lispro (HumaLOG KwikPen) 100 units/mL injection pen   Yes No   Sig: INJECT 8 UNITS SUBCUTANEOUSLY IN THE MORNING , 8 UNITS AT NOON  AND 8 UNITS IN THE EVENING .  INJECT WITH MEALS   lisinopril (ZESTRIL) 2.5 mg tablet   Yes No   Sig: Take 1 tablet by mouth daily   omeprazole (PriLOSEC) 40 MG capsule   No No   Sig: Take 1 capsule (40 mg total) by mouth 2 (two) times a day for 14 days      Facility-Administered Medications: None     Discharge Medication List as of 10/13/2024  2:32 PM        CONTINUE these medications which have NOT CHANGED    Details   aspirin 81 mg chewable tablet Chew 81 mg daily, Historical Med      baclofen 10 mg tablet Take 10 mg by mouth 2 (two) times a day, Starting Fri 4/19/2024, Historical Med      clopidogrel (PLAVIX) 75 mg tablet Take 75 mg by mouth daily, Starting Fri 4/19/2024, Until Wed 11/13/2024, Historical Med      Eliquis 5 MG TAKE 1 TABLET BY MOUTH IN THE  MORNING AND 1 TABLET BY MOUTH  BEFORE BEDTIME, Historical Med      ezetimibe (ZETIA) 10 mg tablet Historical Med      famotidine (PEPCID) 20 mg tablet Take 20 mg by mouth 2 (two) times a day, Starting Fri 4/19/2024, Historical Med      gabapentin (NEURONTIN) 100 mg capsule Take 100 mg by mouth Three times a day, Starting Fri 4/19/2024, Historical Med      insulin lispro (HumaLOG KwikPen) 100 units/mL injection pen INJECT 8 UNITS SUBCUTANEOUSLY IN THE MORNING , 8 UNITS AT NOON  AND 8 UNITS IN THE EVENING .  INJECT WITH MEALS, Historical Med      lisinopril (ZESTRIL) 2.5 mg tablet Take 1 tablet by mouth daily, Starting Tue 2/27/2024, Historical Med      omeprazole (PriLOSEC) 40 MG capsule Take 1 capsule (40 mg total) by mouth 2 (two) times a day for 14 days, Starting Mon 7/29/2024, Until Mon 8/12/2024, Normal           No  discharge procedures on file.  ED SEPSIS DOCUMENTATION   Time reflects when diagnosis was documented in both MDM as applicable and the Disposition within this note       Time User Action Codes Description Comment    10/13/2024  2:31 PM Katherine Man Add [M70.61] Trochanteric bursitis of right hip                  Katherine Man, DO  10/13/24 1559